# Patient Record
Sex: FEMALE | Race: WHITE | NOT HISPANIC OR LATINO | Employment: UNEMPLOYED | URBAN - METROPOLITAN AREA
[De-identification: names, ages, dates, MRNs, and addresses within clinical notes are randomized per-mention and may not be internally consistent; named-entity substitution may affect disease eponyms.]

---

## 2022-12-07 ENCOUNTER — TRANSFERRED RECORDS (OUTPATIENT)
Dept: HEALTH INFORMATION MANAGEMENT | Facility: CLINIC | Age: 22
End: 2022-12-07

## 2022-12-07 ENCOUNTER — HOSPITAL ENCOUNTER (EMERGENCY)
Facility: HOSPITAL | Age: 22
Discharge: HOME | End: 2022-12-07
Attending: EMERGENCY MEDICINE | Admitting: EMERGENCY MEDICINE
Payer: COMMERCIAL

## 2022-12-07 VITALS
HEIGHT: 65 IN | RESPIRATION RATE: 20 BRPM | WEIGHT: 158.8 LBS | SYSTOLIC BLOOD PRESSURE: 104 MMHG | OXYGEN SATURATION: 96 % | DIASTOLIC BLOOD PRESSURE: 69 MMHG | BODY MASS INDEX: 26.46 KG/M2 | TEMPERATURE: 96 F | HEART RATE: 58 BPM

## 2022-12-07 DIAGNOSIS — R55 VASOVAGAL SYNCOPE: ICD-10-CM

## 2022-12-07 PROBLEM — E66.9 OBESITY: Status: ACTIVE | Noted: 2017-03-29

## 2022-12-07 PROBLEM — F41.9 ANXIETY DISORDER: Status: ACTIVE | Noted: 2019-11-01

## 2022-12-07 LAB
ANION GAP SERPL CALCULATED.3IONS-SCNC: 7 MMOL/L (ref 7–15)
BASOPHILS # BLD AUTO: 0 10E3/UL (ref 0–0.2)
BASOPHILS NFR BLD AUTO: 0 %
BUN SERPL-MCNC: 18.2 MG/DL (ref 6–20)
CALCIUM SERPL-MCNC: 9.1 MG/DL (ref 8.6–10)
CHLORIDE SERPL-SCNC: 98 MMOL/L (ref 98–107)
CREAT SERPL-MCNC: 0.74 MG/DL (ref 0.51–0.95)
DEPRECATED HCO3 PLAS-SCNC: 26 MMOL/L (ref 22–29)
EOSINOPHIL # BLD AUTO: 0.1 10E3/UL (ref 0–0.7)
EOSINOPHIL NFR BLD AUTO: 1 %
ERYTHROCYTE [DISTWIDTH] IN BLOOD BY AUTOMATED COUNT: 13.2 % (ref 10–15)
GFR SERPL CREATININE-BSD FRML MDRD: >90 ML/MIN/1.73M2
GLUCOSE SERPL-MCNC: 101 MG/DL (ref 70–99)
HCT VFR BLD AUTO: 41.9 % (ref 35–47)
HGB BLD-MCNC: 13.6 G/DL (ref 11.7–15.7)
IMM GRANULOCYTES # BLD: 0 10E3/UL
IMM GRANULOCYTES NFR BLD: 0 %
LYMPHOCYTES # BLD AUTO: 1.8 10E3/UL (ref 0.8–5.3)
LYMPHOCYTES NFR BLD AUTO: 32 %
MAGNESIUM SERPL-MCNC: 2 MG/DL (ref 1.7–2.3)
MCH RBC QN AUTO: 27 PG (ref 26.5–33)
MCHC RBC AUTO-ENTMCNC: 32.5 G/DL (ref 31.5–36.5)
MCV RBC AUTO: 83 FL (ref 78–100)
MONOCYTES # BLD AUTO: 0.6 10E3/UL (ref 0–1.3)
MONOCYTES NFR BLD AUTO: 12 %
NEUTROPHILS # BLD AUTO: 3 10E3/UL (ref 1.6–8.3)
NEUTROPHILS NFR BLD AUTO: 55 %
NRBC # BLD AUTO: 0 10E3/UL
NRBC BLD AUTO-RTO: 0 /100
PLATELET # BLD AUTO: 271 10E3/UL (ref 150–450)
POTASSIUM SERPL-SCNC: 4.3 MMOL/L (ref 3.4–5.3)
RBC # BLD AUTO: 5.03 10E6/UL (ref 3.8–5.2)
SODIUM SERPL-SCNC: 131 MMOL/L (ref 136–145)
TSH SERPL DL<=0.005 MIU/L-ACNC: 2.81 UIU/ML (ref 0.3–4.2)
WBC # BLD AUTO: 5.6 10E3/UL (ref 4–11)

## 2022-12-07 PROCEDURE — 85041 AUTOMATED RBC COUNT: CPT

## 2022-12-07 PROCEDURE — 84443 ASSAY THYROID STIM HORMONE: CPT

## 2022-12-07 PROCEDURE — 80048 BASIC METABOLIC PNL TOTAL CA: CPT

## 2022-12-07 PROCEDURE — 85014 HEMATOCRIT: CPT

## 2022-12-07 PROCEDURE — 99284 EMERGENCY DEPT VISIT MOD MDM: CPT

## 2022-12-07 PROCEDURE — 93005 ELECTROCARDIOGRAM TRACING: CPT | Performed by: EMERGENCY MEDICINE

## 2022-12-07 PROCEDURE — 36415 COLL VENOUS BLD VENIPUNCTURE: CPT

## 2022-12-07 PROCEDURE — 96360 HYDRATION IV INFUSION INIT: CPT

## 2022-12-07 PROCEDURE — 83735 ASSAY OF MAGNESIUM: CPT

## 2022-12-07 PROCEDURE — 258N000003 HC RX IP 258 OP 636

## 2022-12-07 RX ADMIN — SODIUM CHLORIDE 1000 ML: 9 INJECTION, SOLUTION INTRAVENOUS at 11:03

## 2022-12-07 ASSESSMENT — ENCOUNTER SYMPTOMS
SHORTNESS OF BREATH: 0
HEADACHES: 0
FEVER: 0
NAUSEA: 0
VOMITING: 0
DIZZINESS: 0
ABDOMINAL PAIN: 0
LIGHT-HEADEDNESS: 0
CHILLS: 0

## 2022-12-07 ASSESSMENT — ACTIVITIES OF DAILY LIVING (ADL): ADLS_ACUITY_SCORE: 35

## 2022-12-07 NOTE — ED TRIAGE NOTES
amb to triatge with parents.  Mom reports pt passed out this mroning getting out of the bath.  When passed out mom caught her and pt did not hit head or get injured.  No sz activity per mom and no hx of dx.      Triage Assessment     Row Name 12/07/22 1015       Triage Assessment (Adult)    Airway WDL WDL       Respiratory WDL    Respiratory WDL WDL       Skin Circulation/Temperature WDL    Skin Circulation/Temperature WDL WDL       Cardiac WDL    Cardiac WDL X;all    Pulse Rate & Regularity tachycardic       Peripheral/Neurovascular WDL    Peripheral Neurovascular WDL WDL       Cognitive/Neuro/Behavioral WDL    Cognitive/Neuro/Behavioral WDL WDL

## 2022-12-07 NOTE — ED NOTES
I, Micki Medel have reviewed the documentation, personally taken the patient's history, performed an exam and agree with the physical finds, diagnosis and management plan.    HPI:      Katie Sloan is a 22 year old female with a pertinent history of autism who presents to this ED for evaluation of syncope.     This morning around 0900,  While getting out of the bathtub patient had a syncopal episode. Patient did not fall or hit her head. Patient lost consciousness for several seconds. Per mother patient did not have seizure-like activity during this episode with no postictal phase after awakening. Patient never had similar episodes in the past.     No new medication changes. Patient has an IUD.     Patient declines headache, fever, chills, abdominal pain, nausea vomiting, chest pain or shortness of breath.      Physical Exam:    General Appearance: Delayed-appearing,  well-nourished, no acute distress   Head:  Normocephalic, atraumatic  Cardio:  Bradycardic (50s), no murmur/gallop/rub, 2+ pulses symmetric in all extremities  Pulm:  No respiratory distress, clear to auscultation bilaterally  Abdomen:  Soft, non-tender, non distended,no rebound or guarding.  Neuro:  Poor historian,  Alert and oriented ×3    MDM: Strongly favor vasovagal syncope in light of heat exposure in addition to neuroleptic medications.  Did have some tachycardia upon arrival, but is Wells low risk and I do not think warrants evaluation for PE given lack of chest pain shortness of breath and brief syncopal episode.  She has IUD, does not get menses.  Unlikely anemia.  Concern for potential electrolyte abnormality or thyroid abnormality as well as arrhythmia though I feel less likely.    ED Course/workup: EKG shows sinus bradycardia.  Laboratory work-up unremarkable.  Patient felt improved with IV fluid bolus and will be discharged home.      ED Course as of 12/07/22 1205   Wed Dec 07, 2022   1136 I updated the patient regarding her lab results  and discussed plan for care and discharge.       EKG:  EKG individually read and interpreted by myself:  Sinus bradycardia rate 57.  No notable ST-T wave abnormalities and normal intervals with QRS 82 and .  No previous EKGs which to compare.    Final Diagnosis:     ICD-10-CM    1. Vasovagal syncope  R55               I personally saw the patient and performed a substantive portion of the visit including all aspects of the medical decision making.     MD Gianfranco Tobin Zabrina N, MD  12/07/22 1202

## 2022-12-07 NOTE — DISCHARGE INSTRUCTIONS
Drink fluids.  Rest.  Return to the ED if new symptoms develop or symptoms worsen.  Follow-up with your primary care provider to discuss your ED visit.  [facility]'s phone number was provided to you.

## 2022-12-07 NOTE — ED PROVIDER NOTES
EMERGENCY DEPARTMENT ENCOUNTER      NAME: Katie Sloan  AGE: 22 year old female  YOB: 2000  MRN: [id number]  EVALUATION DATE & TIME: 12/7/2022 10:21 AM    PCP: Trudy Laguerre    ED PROVIDER: SREEDHAR SINGH PA-C      Chief Complaint   Patient presents with     Syncope       FINAL IMPRESSION:  1. Vasovagal syncope      ED COURSE & MEDICAL DECISION MAKING:    Pertinent Labs & Imaging studies reviewed. (See chart for details)  22 year old female presents to the Emergency Department for evaluation of syncope. This morning this morning while getting out of the tub patient had a few seconds syncopal episode.  No fall. Vital signs reviewed and unremarkable.  On exam, Only told if he had a headache over overall patient was tachycardic which returned to normal at discharge. Exam is otherwise unremarkable.     Differential diagnosis includes arrhythmias, electrolyte abnormalities, vasovagal syncope. CBC, TSH, magnesium were unremarkable. Basic showed hyponatremia. Patient was tachycardic on arrival.  Low risk for PE due to Wells criteria being low risk.  Patient does have an IUD but does not get her menses. No sings of infection at this time.     Patient was given fluids.  Symptoms consistent with vasovagal syncope.  Plan is to discharge home.  Patient will rest.  Drink lots of fluids.  Patient and patient's family was educated on the lab results and EKG results.  Patient's mom requested to be referred to [place] [facility].  Parents are agreeable to the plan.  All questions were answered.    ED Course as of 12/07/22 1154   Wed Dec 07, 2022   1136 I updated the patient regarding her lab results and discussed plan for care and discharge.     ED COURSE:  10:30 I saw the patient. Staffed with Dr. Medel.   11:30 AM I checked on the patient.   11:44 AM I updated the patient's family on the lab results. Patient will be discharged home. All questions were answered. Family agrees with the plan.       MEDICATIONS  GIVEN IN THE EMERGENCY:  Medications   0.9% sodium chloride BOLUS (1,000 mLs Intravenous New Bag 12/7/22 1103)       NEW PRESCRIPTIONS STARTED AT TODAY'S ER VISIT  New Prescriptions    No medications on file       =================================================================    HPI    Patient information was obtained from: Parents    Use of : N/A     Katie Sloan is a 22 year old female with a pertinent history of autism who presents to this ED for evaluation of syncope.    This morning at 0900,  While getting out of the bathtub patient had a syncopal episode.  Patient did not fall or hit her head.  Patient lost consciousness for several seconds.  Per mother patient did not have seizure-like activity during this episode and return to normal after awakening.  Patient has never had a similar episode before.  Patient ate breakfast without difficulties.     No new medication changes.  Patient has an IUD.    Patient declines headache,dizziness, lightheadedness, fever, chills, abdominal pain, nausea vomiting, chest pain or shortness of breath.      REVIEW OF SYSTEMS   Review of Systems   Constitutional: Negative for chills and fever.   Respiratory: Negative for shortness of breath.    Cardiovascular: Negative for chest pain.   Gastrointestinal: Negative for abdominal pain, nausea and vomiting.   Neurological: Positive for syncope. Negative for dizziness, light-headedness and headaches.   All other systems reviewed and are negative.    PAST MEDICAL HISTORY:  No past medical history on file.    PAST SURGICAL HISTORY:  No past surgical history on file.    CURRENT MEDICATIONS:    guanFACINE (TENEX) 2 MG tablet  loratadine (CLARITIN) 10 mg tablet  LORazepam (ATIVAN) 2 MG tablet  melatonin 3 mg Tab tablet  risperiDONE (RISPERDAL M-TABS) 0.5 MG disintegrating tablet  risperiDONE (RISPERDAL) 1 MG tablet  sertraline (ZOLOFT) 25 MG tablet  traZODone (DESYREL) 100 MG tablet        ALLERGIES:  Allergies   Allergen  "Reactions     Penicillins Hives       FAMILY HISTORY:  No family history on file.    SOCIAL HISTORY:   Social History     Socioeconomic History     Marital status: Single       VITALS:  /62   Pulse 83   Temp (!) 96  F (35.6  C) (Temporal)   Resp 20   Ht 1.651 m (5' 5\")   Wt 72 kg (158 lb 12.8 oz)   SpO2 96%   BMI 26.43 kg/m      PHYSICAL EXAM  Physical Exam  Vitals and nursing note reviewed.   Constitutional:       General: She is not in acute distress.     Appearance: Normal appearance. She is normal weight. She is not ill-appearing, toxic-appearing or diaphoretic.      Comments: Delayed appearing.   HENT:      Head: Atraumatic.      Right Ear: External ear normal.      Left Ear: External ear normal.      Nose: Nose normal.      Mouth/Throat:      Mouth: Mucous membranes are moist.   Eyes:      Conjunctiva/sclera: Conjunctivae normal.      Pupils: Pupils are equal, round, and reactive to light.   Cardiovascular:      Rate and Rhythm: Regular rhythm. Tachycardia present.      Pulses: Normal pulses.      Heart sounds: Normal heart sounds. No murmur heard.    No friction rub. No gallop.   Pulmonary:      Effort: Pulmonary effort is normal.      Breath sounds: Normal breath sounds. No wheezing or rales.   Abdominal:      Tenderness: There is no abdominal tenderness. There is no guarding or rebound.   Musculoskeletal:      Cervical back: Normal range of motion.   Skin:     General: Skin is dry.   Neurological:      Mental Status: She is alert. Mental status is at baseline.      Comments: History provided by mother.   Psychiatric:         Mood and Affect: Mood normal.         Thought Content: Thought content normal.        LAB:  All pertinent labs reviewed and interpreted.  Results for orders placed or performed during the hospital encounter of 12/07/22   Basic metabolic panel   Result Value Ref Range    Sodium 131 (L) 136 - 145 mmol/L    Potassium 4.3 3.4 - 5.3 mmol/L    Chloride 98 98 - 107 mmol/L    " Carbon Dioxide (CO2) 26 22 - 29 mmol/L    Anion Gap 7 7 - 15 mmol/L    Urea Nitrogen 18.2 6.0 - 20.0 mg/dL    Creatinine 0.74 0.51 - 0.95 mg/dL    Calcium 9.1 8.6 - 10.0 mg/dL    Glucose 101 (H) 70 - 99 mg/dL    GFR Estimate >90 >60 mL/min/1.73m2   Result Value Ref Range    TSH 2.81 0.30 - 4.20 uIU/mL   Result Value Ref Range    Magnesium 2.0 1.7 - 2.3 mg/dL   CBC with platelets and differential   Result Value Ref Range    WBC Count 5.6 4.0 - 11.0 10e3/uL    RBC Count 5.03 3.80 - 5.20 10e6/uL    Hemoglobin 13.6 11.7 - 15.7 g/dL    Hematocrit 41.9 35.0 - 47.0 %    MCV 83 78 - 100 fL    MCH 27.0 26.5 - 33.0 pg    MCHC 32.5 31.5 - 36.5 g/dL    RDW 13.2 10.0 - 15.0 %    Platelet Count 271 150 - 450 10e3/uL    % Neutrophils 55 %    % Lymphocytes 32 %    % Monocytes 12 %    % Eosinophils 1 %    % Basophils 0 %    % Immature Granulocytes 0 %    NRBCs per 100 WBC 0 <1 /100    Absolute Neutrophils 3.0 1.6 - 8.3 10e3/uL    Absolute Lymphocytes 1.8 0.8 - 5.3 10e3/uL    Absolute Monocytes 0.6 0.0 - 1.3 10e3/uL    Absolute Eosinophils 0.1 0.0 - 0.7 10e3/uL    Absolute Basophils 0.0 0.0 - 0.2 10e3/uL    Absolute Immature Granulocytes 0.0 <=0.4 10e3/uL    Absolute NRBCs 0.0 10e3/uL       RADIOLOGY: None  Reviewed all pertinent imaging. Please see official radiology report.  No orders to display       EKG:    Performed at: 10:25  Impression: Sinus bradycardia. Otherwise normal EKG  Rate: 57   SC Interval: 134  QRS Interval: 82  QTc Interval: 420/408    I have independently reviewed and interpreted the EKG(s) documented above.    PROCEDURES:   None       SREEDHAR SINGH PA-C  [facility] EMERGENCY DEPARTMENT  [address] [place] [id number] [phone]   Yes

## 2023-04-06 ENCOUNTER — HOSPITAL ENCOUNTER (EMERGENCY)
Facility: HOSPITAL | Age: 23
Discharge: HOME | End: 2023-04-06
Attending: EMERGENCY MEDICINE | Admitting: EMERGENCY MEDICINE
Payer: COMMERCIAL

## 2023-04-06 VITALS
SYSTOLIC BLOOD PRESSURE: 135 MMHG | DIASTOLIC BLOOD PRESSURE: 75 MMHG | HEART RATE: 124 BPM | HEIGHT: 65 IN | WEIGHT: 165 LBS | BODY MASS INDEX: 27.49 KG/M2 | OXYGEN SATURATION: 96 % | RESPIRATION RATE: 18 BRPM | TEMPERATURE: 97.6 F

## 2023-04-06 DIAGNOSIS — Z86.59 HISTORY OF AUTISM: ICD-10-CM

## 2023-04-06 LAB
ALBUMIN UR-MCNC: 20 MG/DL
APPEARANCE UR: CLEAR
BACTERIA #/AREA URNS HPF: ABNORMAL /HPF
BILIRUB UR QL STRIP: NEGATIVE
COLOR UR AUTO: YELLOW
GLUCOSE UR STRIP-MCNC: NEGATIVE MG/DL
HCG UR QL: NEGATIVE
HGB UR QL STRIP: ABNORMAL
KETONES UR STRIP-MCNC: NEGATIVE MG/DL
LEUKOCYTE ESTERASE UR QL STRIP: ABNORMAL
MUCOUS THREADS #/AREA URNS LPF: PRESENT /LPF
NITRATE UR QL: NEGATIVE
PH UR STRIP: 5.5 [PH] (ref 5–7)
RBC URINE: 2 /HPF
SP GR UR STRIP: 1.03 (ref 1–1.03)
SQUAMOUS EPITHELIAL: 1 /HPF
UROBILINOGEN UR STRIP-MCNC: <2 MG/DL
WBC URINE: 4 /HPF

## 2023-04-06 PROCEDURE — 99285 EMERGENCY DEPT VISIT HI MDM: CPT | Mod: 25

## 2023-04-06 PROCEDURE — 81025 URINE PREGNANCY TEST: CPT | Performed by: EMERGENCY MEDICINE

## 2023-04-06 PROCEDURE — 87086 URINE CULTURE/COLONY COUNT: CPT | Performed by: EMERGENCY MEDICINE

## 2023-04-06 PROCEDURE — 90791 PSYCH DIAGNOSTIC EVALUATION: CPT

## 2023-04-06 PROCEDURE — 250N000013 HC RX MED GY IP 250 OP 250 PS 637: Performed by: EMERGENCY MEDICINE

## 2023-04-06 PROCEDURE — 81001 URINALYSIS AUTO W/SCOPE: CPT | Performed by: EMERGENCY MEDICINE

## 2023-04-06 RX ORDER — LORAZEPAM 0.5 MG/1
1 TABLET ORAL ONCE
Status: COMPLETED | OUTPATIENT
Start: 2023-04-06 | End: 2023-04-06

## 2023-04-06 RX ADMIN — LORAZEPAM 1 MG: 0.5 TABLET ORAL at 08:53

## 2023-04-06 ASSESSMENT — ACTIVITIES OF DAILY LIVING (ADL): ADLS_ACUITY_SCORE: 35

## 2023-04-06 NOTE — ED TRIAGE NOTES
Patient brought in by her parents--is autistic--had a medication change--her behavior has gotten worse--agitation--not sleeping well--outbursts--not eating well.

## 2023-04-06 NOTE — DISCHARGE INSTRUCTIONS
Aftercare Plan  If I am feeling unsafe or I am in a crisis, I will:   Contact my established care providers   Call the National Suicide Prevention Lifeline: 988  Go to the nearest emergency room   Call 911     Urine test does not show evidence of infection  Blood could not be collected  Connect with your psychiatrist      Please follow up with your psychiatrist, MD Valeriy, [phone], as soon as possible.    Please encourage Katie to return to her day program.   Changes in daily routine/structure can cause an increase in behavioral issues.    [facility] [phone]    Contact your [facility]  and utilize any supports available such as respite care and PCA care        Warning signs that I or other people might notice when a crisis is developing for me:   Suicidal thoughts or thoughts of physical self harm  Sudden increase in anxiety or depression  Increase in racing thoughts/impulsive feelings  Increase in stress or anger     Things I am able to do on my own to cope or help me feel better:      Grounding Techniques:  Try to notice where you are, your surroundings including the people, the sounds like the TV or radio.  Concentrate on your breathing. Take a deep cleansing breath from your diaphragm. Count the breaths as you exhale. Make sure you breath slowly.  Hold something that you find comforting, for some it may be a stuffed animal or a blanket. Notice how it feels in your hands. Is it hard or soft?  During a non-crisis time make a list of positive affirmations. Print them out and keep them handy for times of intense anxiety. At those times, read them aloud.  Try the Journeys game:  Name 5 things you can see in the room with you  Name 4 things you can feel ( chair on my back  or  feet on floor )   Name 3 things you can hear right now ( people talking  or  tv )   Name 2 things you can smell right now (or, 2 things you like the smell of)   Name 1 good thing about yourself  Create A Safe Place  Image  "a safe place -- it can be a real or imaginary place:   What do you see -- especially colors?   What sounds do you hear?   What sensations do you feel?   What smells do you smell?   What people or animals would you want in your safe place?   Imagine a protective bubble, wall or boundary around your safe place.   Imagine a door or gate with a guard at your safe place.   Image a lock and key to your safe place and only you can unlock it.  You can draw or make a collage that represents your safe place.   Choose a souvenir of your safe place -- a color, an object, a song.   Keep your image of your safe place so you can come back to it when you need to.    Things that I am able to do with others to cope or help me better:     Spend time with trusted individual  Share thoughts and feelings      Changes I can make to support my mental health and wellness:     Follow through with outpatient psychiatrist and take all medications as prescribed  Get enough sleep  Create and maintain a routine    People in my life that I can ask for help:     Family  Trusted friend  Outpatient providers  Crisis workers   Drop in Centers     Your [facility] has a mental health crisis team you can call 24/7: [facility] Crisis [phone]    Additional resources and information:    Crisis Lines  Crisis Text Line  Text 652166  You will be connected with a trained live crisis counselor to provide support.    Community Resources  Fast Tracker  Linking people to mental health and substance use disorder resources  fasttrackermn.org     [place] [facility] Warm Line  Peer to peer support  Monday thru Saturday, 12 pm to 10 pm  537.663.2535 or 1.076.977.1731  Text \"Support\" to 86498    [employer] Mental Illness (FELIPE)  643.945.1392 or 1.888.FELIPE.HELPS    Mental Health Apps  My3  https://my3app.org/    VirtualHopeBox  https://[employer]org/apps/virtual-hope-box/    Additional Information  Today you were seen by a licensed mental " health professional through Triage and Transition services, [facility] ([facility])  for a crisis assessment in the Emergency Department at [facility].  It is recommended that you follow up with your established providers (psychiatrist, mental health therapist, and/or primary care doctor - as relevant) as soon as possible. Coordinators from [facility] will be calling you in the next 24-48 hours to ensure that you have the resources you need.  You can also contact [facility] coordinators directly at [phone]. You may have been scheduled for or offered an appointment with a mental health provider. [facility] maintains an extensive network of licensed behavioral health providers to connect patients with the services they need.  We do not charge providers a fee to participate in our referral network.  We match patients with providers based on a patient's specific needs, insurance coverage, and location.  Our first effort will be to refer you to a provider within your care system, and will utilize providers outside your care system as needed.

## 2023-04-06 NOTE — CONSULTS
Diagnostic Evaluation Consultation  Crisis Assessment    Patient was assessed: Lily  Patient location: [facility]  Was a release of information signed: Yes. Providers included on the release: MD Valeriy      Referral Data and Chief Complaint  The patient is a 22 year old, who uses she/her pronouns, and presents to the ED with family/friends. Patient is referred to the ED by family/friends. Patient is presenting to the ED for the following concerns: behavioral issues.      Informed Consent and Assessment Methods    Patient is reported to be under the guardianship of parents, Radhames and Teena Sloan, verified by the [place] [address] Website. Writer met with patient and guardian and explained the crisis assessment process, including applicable information disclosures and limits to confidentiality, assessed understanding of the process, and obtained consent to proceed with the assessment. Patient was observed to be able to participate in the assessment as evidenced by verbalized understanding. Assessment methods included conducting a formal interview with patient, review of medical records, collaboration with medical staff, and obtaining relevant collateral information from family and community providers when available.    Over the course of this crisis assessment provided reassurance, offered validation, engaged patient in problem solving and disposition planning, worked with patient on safety and aftercare planning and provided psychoeducation. Cara's response to interventions was engaged. Patient had limited participation.      Summary of Patient Situation     The patient is a 22 year old female with a history of Autism, Intellectual Disability, behaviors, and aggression, who was brought to [facility] ED by her parents for an evaluation of an increase in screaming and aggression.      The patient is under the care of psychiatrist, Dr. Crooks, [facility], who is in the process of changing  "medicines.    Two weeks ago the patient started refusing to go to her day program,  [employer] and the program has now unenrolled her until she wants to return. She had a history of aggression toward staff at school.     Over the past week, the patient has been eating less, sleeping less, and has started screaming \"for no reason\" when in the past her screaming would have a reason.     Today, parents report the patient started screaming and didn't stop for over 15 minutes so they decided to bring her to the ED. The gave her 2mg of Ativan. The patient has continued to have intermittent episodes of screaming while in the ED.     In the ED the patient stayed in her hospital bed and was asking to go home. Due to her intellectual disability, much of the interview took place with the parents.     Brief Psychosocial History    The patient lives at home with her parents. Parents are legal guardians. Parents state their plan is to always keep her living at home. The patient was attending a Transitions program until she refused to go two weeks ago.     Significant Clinical History    The patient is treated by psychiatrist, Tanika Crooks MD, at [facility]. [facility] shows no history of admissions.      Collateral Information    Called out to Dr. Crooks, on hold from 0820 to 0850. Message left with her assistant at 0850. Call back from Valeriy's RN at 0921 stating she will ask Valeriy to call Dr. Ortiz directly.     Spoke with patient's parents via [employer] to obtain collateral.      Risk Assessment    Patient was not able to to participate in the [place] assessment due to intellectual disability and current dysregulation.      Does the patient have thoughts of harming others? None resorted by parents      Is the patient engaging in sexually inappropriate behavior?  no      Current Substance Abuse     Is there recent substance abuse? no     Was a urine drug screen or blood alcohol level obtained: " No    Mental Status Exam     Affect: Labile   Appearance: Appropriate    Attention Span/Concentration: Inattentive  Eye Contact: Variable   Fund of Knowledge: Delayed    Language /Speech Content: Non-fluent   Language /Speech Volume: Loud    Language /Speech Rate/Productions: Minimally Responsive    Recent Memory: Other: unable to assess    Remote Memory: Other: unable to assess    Mood: Anxious    Orientation to Person: Answer: unable to assess     Orientation to Place: Answer: unable to assess    Orientation to Time of Day: Answer: unable to assess     Orientation to Date: Answer: unable to assess     Situation (Do they understand why they are here?): Answer: unable to assess     Psychomotor Behavior: Agitated    Thought Content: Other: unable to assess    Thought Form: Other: unable to assess       History of commitment: No, parents are guardians     Medication    Psychotropic medications: Yes  Medication changes made in the last two weeks: Yes    Current Care Team    Primary Care Provider: Trudy Laguerre MD, [facility]  Psychiatrist: Tanika Crooks MD [facility]  Therapist: No  : No     CTSS or ARMHS: No  ACT Team: No  Other: No    Diagnosis    299.00 (F84.0) Autism Spectrum Disorder by history   319 (F79) Unspecified Intellectual Disability by history    Clinical Summary and Substantiation of Recommendations    The patient has a history of autism and is in the middle of a medication change and has had a major change in routine when she stopped going to her day program two weeks ago. Both of these can cause an increase in behavioral issues. The patient has been experiencing an increase in behaviors such as hitting and screaming. Because of her coordination challenges she doesn't typically cause harm during her episodes of aggression.   It is recommended that the patient continue to work with MD Valeriy, on medicine management and that she return to her day program as the structure and  routine may be helpful. Parents can also follow up with the county to access more resources if needed. Patient was discharged home with her parents/guardians.   Disposition    Recommended disposition: Medication management and day program      Reviewed case and recommendations with attending provider. Attending Name: MD Diana       Attending concurs with disposition: Yes       Patient and/or validated legal guardian concurs with disposition: Yes      Outpatient Details (if applicable):   Aftercare plan and appointments placed in the AVS and provided to patient: Yes. Given to patient by ED staff    Was lethal means counseling provided as a part of aftercare planning? Parents have a safety plan       Assessment Details    Patient interview started at: 0807 and completed at: 0817.     Total duration spent on the patient case in minutes: 1.0 hrs      CPT code(s) utilized: 76827 - Psychotherapy for Crisis - 60 (30-74*) min     [facility]   DEC - Triage & Transition Services   Callback: [phone]          Aftercare Plan  If I am feeling unsafe or I am in a crisis, I will:   Contact my established care providers   Call the National Suicide Prevention Lifeline: 988  Go to the nearest emergency room   Call 911     Please follow up with your psychiatrist, MD Valeriy, [phone], as soon as possible.    Please encourage Katie to return to her day program. Changes in daily routine/structure can cause an increase in behavioral issues.    [facility] Crisis [phone]    Contact your [facility]  and utilize any supports available such as respite care and PCA care        Warning signs that I or other people might notice when a crisis is developing for me:   Suicidal thoughts or thoughts of physical self harm  Sudden increase in anxiety or depression  Increase in racing thoughts/impulsive feelings  Increase in stress or anger     Things I am able to do on my own to cope or help me feel better:      Grounding  Techniques:    Try to notice where you are, your surroundings including the people, the sounds like the TV or radio.    Concentrate on your breathing. Take a deep cleansing breath from your diaphragm. Count the breaths as you exhale. Make sure you breath slowly.    Hold something that you find comforting, for some it may be a stuffed animal or a blanket. Notice how it feels in your hands. Is it hard or soft?    During a non-crisis time make a list of positive affirmations. Print them out and keep them handy for times of intense anxiety. At those times, read them aloud.  Try the Peerz game:    Name 5 things you can see in the room with you    Name 4 things you can feel ( chair on my back  or  feet on floor )     Name 3 things you can hear right now ( people talking  or  tv )     Name 2 things you can smell right now (or, 2 things you like the smell of)     Name 1 good thing about yourself  Create A Safe Place    Image a safe place -- it can be a real or imaginary place:     What do you see -- especially colors?     What sounds do you hear?     What sensations do you feel?     What smells do you smell?     What people or animals would you want in your safe place?     Imagine a protective bubble, wall or boundary around your safe place.     Imagine a door or gate with a guard at your safe place.     Image a lock and key to your safe place and only you can unlock it.    You can draw or make a collage that represents your safe place.     Choose a souvenir of your safe place -- a color, an object, a song.   Keep your image of your safe place so you can come back to it when you need to.    Things that I am able to do with others to cope or help me better:     Spend time with trusted individual  Share thoughts and feelings      Changes I can make to support my mental health and wellness:     Follow through with outpatient psychiatrist and take all medications as prescribed  Get enough sleep  Create and maintain a  "routine    People in my life that I can ask for help:     Family  Trusted friend  Outpatient providers  Crisis workers   Drop in Centers     Your county has a mental health crisis team you can call 24/7: [facility] [phone]    Additional resources and information:    Crisis Lines  Crisis Text Line  Text 041266  You will be connected with a trained live crisis counselor to provide support.    Askem  Fast Tracker  Linking people to mental health and substance use disorder resources  [employer].Streetlife     [place] Mental Health Warm Line  Peer to peer support  Monday thru Saturday, 12 pm to 10 pm  390.702.9561 or 1.720.324.6496  Text \"Support\" to 74565    National [place] on Mental Illness (FELIPE)  103.611.0100 or 1.888.FELIPE.HELPS    Mental Health Apps  My3  https://Dress Code.org/    VirtualHopeBox  https://i[employer] Security/apps/virtual-hope-box/    Additional Information  Today you were seen by a licensed mental health professional through Triage and Transition services, [facility] ([facility])  for a crisis assessment in the Emergency Department at [facility].  It is recommended that you follow up with your established providers (psychiatrist, mental health therapist, and/or primary care doctor - as relevant) as soon as possible. Coordinators from [facility] will be calling you in the next 24-48 hours to ensure that you have the resources you need.  You can also contact [facility] coordinators directly at [phone]. You may have been scheduled for or offered an appointment with a mental health provider. [facility] maintains an extensive network of licensed behavioral health providers to connect patients with the services they need.  We do not charge providers a fee to participate in our referral network.  We match patients with providers based on a patient's specific needs, insurance coverage, and location.  Our first effort will be to refer you to a provider within your care " system, and will utilize providers outside your care system as needed.

## 2023-04-06 NOTE — ED PROVIDER NOTES
EMERGENCY DEPARTMENT ENCOUNTER            IMPRESSION:  History of autism  Agitation      MEDICAL DECISION MAKING:  It was my pleasure to provide care for Katie Sloan who presented for evaluation of agitated and irritable behavior.  Patient has a history of autism.  She has been followed outpatient by her psychiatrist.  She was seen yesterday and medications adjusted.  No other history of trauma or infectious    Patient is awake and responsive.  She appears anxious and somewhat agitated.  She is somewhat cooperative and agitated    On exam vital signs show tachycardia.  No evidence of trauma.  Her membranes are moist.  Heart is regular.  Abdomen is nontender.      Symptomatic medications including Ativan were given    Significant laboratory findings include normal urinalysis.  Additional blood tests were ordered but the patient refused    DEC assessed the patient felt that she was stable for outpatient management    I spoke with Dr. Siddiqui patient's psychiatrist who recommended continuing with recent medication changes and outpatient evaluation.  Patient treatment not recommended        =================================================================  CHIEF COMPLAINT:  Chief Complaint   Patient presents with     agitiation         HPI  Katie Sloan is a 22 year old female with a history of anxiety disorder, autism spectrum disorder, intellectual disability, who presents to the ED by via walk-in with parents for evaluation of altered behavior.    Patient's parents report that patient recently changed her medication from Zyprexa to Seroquel on 3/1/2023. Since then, patient's behavior has gradually gotten worse. Parents say that she has hit them and screamed at them before but it's not as severe as today's symptoms. Patient is more agitated and aggressive compared to baseline. Patient saw [facility] psychiatrist yesterday (4/5/2023) and they decided to taper off Seroquel and restarting guanfacine with follow up in  the next week.     There were no other concerns/complaints at this time.    Per chart review, patient was seen at [facility] psychiatry 4/5/2023 for evaluation of significant aggression and agitation. Patient has been doing a variety of medication changes recently and her symptoms haven been considerably worsened since tapering off guanfacine because she had an episode of syncope after taking a hot bath. Initially when patient was off guanfacine, there were no behavior changes. They tried Zyprexa which was unsuccessful and hydroxyzine for anxiety which is slightly successful. Parents are having a hard time redirecting patient. Mother also notes that patient has been having some shoulder movements, eye rolling, and mouth puckering recently which was consistent when patient was previously on risperidone when she was younger but none were noted in this visit. The visit was prematurely ended because of patient's behavioral meltdowns and insisting that she wants to go home. Patient was discharged with tapering off Seroquel to dose of 200 mg daily and restart guanfacine extended release 1 mg at night and then to 2 mg with follow up in the next week.      REVIEW OF SYSTEMS   Constitutional: Does not report chills, unintentional weight loss or fatigue   Eyes: Does not report visual changes or discharge    HENT: Does not report sore throat, ear pain or neck pain  Respiratory: Does not report cough or shortness of breath    Cardiovascular: Does not report chest pain, palpitations or leg swelling  GI: Does not report abdominal pain, nausea, vomiting, or dark, bloody stools.    : Does not report hematuria, dysuria, or flank pain  Musculoskeletal: Does not report any new musculoskeletal pain or new muscle/joint pains  Skin: Does not report rash or wound  Neurologic: Does not report current headache, new weakness, focal weakness, or sensory changes  Psychiatric: Endorses increased aggression and  "agitation.      Remainder of systems reviewed, unless noted in HPI all others negative.      PAST MEDICAL HISTORY:  Past Medical History:   Diagnosis Date     Anxiety      Autism spectrum disorder      Intellectual disability        PAST SURGICAL HISTORY:  No past surgical history on file.      CURRENT MEDICATIONS:    guanFACINE (TENEX) 2 MG tablet  loratadine (CLARITIN) 10 mg tablet  LORazepam (ATIVAN) 2 MG tablet  melatonin 3 mg Tab tablet  risperiDONE (RISPERDAL M-TABS) 0.5 MG disintegrating tablet  risperiDONE (RISPERDAL) 1 MG tablet  sertraline (ZOLOFT) 25 MG tablet  traZODone (DESYREL) 100 MG tablet        ALLERGIES:  Allergies   Allergen Reactions     Penicillins Hives       FAMILY HISTORY:  No family history on file.    SOCIAL HISTORY:   Social History     Socioeconomic History     Marital status: Single       PHYSICAL EXAM:    /75   Pulse (!) 124   Temp 97.6  F (36.4  C) (Oral)   Resp 18   Ht 1.651 m (5' 5\")   Wt 74.8 kg (165 lb)   SpO2 96%   BMI 27.46 kg/m      Constitutional: She is awake and responsive somewhat uncooperative and agitated yelling out occasionally  Head: Normocephalic, atraumatic.  ENT: Mucous membranes moist. Posterior oropharynx appears normal.  Eyes: Pupils midrange and reactive ,no conjunctival discharge  Chest: No tenderness   Respiratory: Respirations even,   Cardiovascular: Regular tachycardia  GI: Abdomen soft, non-tender to palpation in all 4 quadrants. No guarding or rebound. Bowel sounds intact on all 4 quadrants.   Back: No CVA tenderness.    Musculoskeletal: Moves all 4 extremities equally, strength symmetrical on bilateral uppers and lowers.  No peripheral edema  Integument: Warm, dry. No rash. No bruising or petechiae.  Lymphatic: No cervical lymphadenopathy  Neurologic: Agitated irritable shouting often able to form sentences and will follow commands occasional  Psychiatric: Normal mood and affect. Normal judgement.    ED COURSE:      7:22 AM I met with the " patient to obtain patient history and performed a physical exam. Discussed plan for ED work up including potential diagnostic studies and interventions.  7:45 AM Nursing staff updated me. Patient is very aggressive with mother and doesn't want blood to be drawn.  8:19 AM Spoke with DEC Milagros.  8:37 AM Reevaluated and updated patient with discussion with DEC, results of urine, and plan to call psychiatrist.   9:15 AM Spoke with DEC Milagros.  9:19 AM Reevaluated and updated patient.   9:42 AM Reevaluated and updated the patient. We discussed the plan for discharge and the patient is agreeable. Reviewed supportive cares, symptomatic treatment, outpatient follow up, and reasons to return to the Emergency Department. Patient to be discharged by ED RN.   9:53 AM Spoke with Psychiatry, Dr. Thornton.        Medical Decision Making    History:    Supplemental history from: Parents    External Record(s) reviewed: External medical records including care everywhere reviewed    Work Up:    EKG laboratory and imaging studies independently reviewed by the provider    Broad differential diagnosis considered for altered mental status and autistic      External consultation:    Discussion of management with another provider: DEC assessment and psychiatry    Complicating factors:    Patient has a complicated past medical history including autism and mental illness    Care affected by social determinants of health: Access to primary care    Disposition considerations: Disposition involved in shared decision-making with the patient patient is for patient instructed to continue outpatient medication as prescribed, referred for follow-up,              LAB:  Laboratory results were independently reviewed and interpreted  Results for orders placed or performed during the hospital encounter of 04/06/23   UA with Microscopic reflex to Culture    Specimen: Urine, Midstream   Result Value Ref Range    Color Urine Yellow Colorless,  Straw, Light Yellow, Yellow    Appearance Urine Clear Clear    Glucose Urine Negative Negative mg/dL    Bilirubin Urine Negative Negative    Ketones Urine Negative Negative mg/dL    Specific Gravity Urine 1.028 1.001 - 1.030    Blood Urine 0.06 mg/dL (A) Negative    pH Urine 5.5 5.0 - 7.0    Protein Albumin Urine 20 (A) Negative mg/dL    Urobilinogen Urine <2.0 <2.0 mg/dL    Nitrite Urine Negative Negative    Leukocyte Esterase Urine 500 Carlos Enrique/uL (A) Negative    Bacteria Urine Few (A) None Seen /HPF    Mucus Urine Present (A) None Seen /LPF    RBC Urine 2 <=2 /HPF    WBC Urine 4 <=5 /HPF    Squamous Epithelials Urine 1 <=1 /HPF   HCG qualitative urine   Result Value Ref Range    hCG Urine Qualitative Negative Negative          MEDICATIONS GIVEN IN THE EMERGENCY:  Medications   LORazepam (ATIVAN) tablet 1 mg (1 mg Oral $Given 4/6/23 0853)           NEW PRESCRIPTIONS STARTED AT TODAY'S ER VISIT:  Discharge Medication List as of 4/6/2023  9:26 AM             Prior to making a final disposition on this patient the results of patient's tests and other diagnostic studies were discussed with the patient. All questions were answered. Patient expressed understanding of the plan and was amenable to it.      FINAL DIAGNOSIS:    ICD-10-CM    1. History of autism  Z86.59                  NAME: Katie Sloan  AGE: 22 year old female  YOB: 2000  MRN: [id number]  EVALUATION DATE & TIME: 4/6/2023  7:19 AM    PCP: Trudy Laguerre    ED PROVIDER: Terell Ortiz M.D.      Riri RODRIGUEZ, am serving as a scribe to document services personally performed by Dr. Terell Ortiz based on my observation and the provider's statements to me. ITerell MD attest that Riri Hays is acting in a scribe capacity, has observed my performance of the services and has documented them in accordance with my direction.    Terell Ortiz M.D.  Emergency Medicine  [facility] EMERGENCY DEPARTMENT  [address]  [facility] [id number]  [phone]  Dept: [phone]  4/6/2023       Terell Ortiz MD  04/06/23 1858

## 2023-04-06 NOTE — ED NOTES
Reviewed discharge with parents. Encouraged to follow-up with psych and provider reviewed facilities with inpt psyc units if that is needed in future.  Encouraged parents to seek respite care.

## 2023-04-07 LAB — BACTERIA UR CULT: NO GROWTH
